# Patient Record
Sex: FEMALE
[De-identification: names, ages, dates, MRNs, and addresses within clinical notes are randomized per-mention and may not be internally consistent; named-entity substitution may affect disease eponyms.]

---

## 2018-07-12 ENCOUNTER — APPOINTMENT (OUTPATIENT)
Dept: ORTHOPEDIC SURGERY | Facility: CLINIC | Age: 68
End: 2018-07-12

## 2019-06-05 ENCOUNTER — APPOINTMENT (OUTPATIENT)
Dept: UROLOGY | Facility: CLINIC | Age: 69
End: 2019-06-05

## 2019-06-13 ENCOUNTER — APPOINTMENT (OUTPATIENT)
Dept: UROLOGY | Facility: CLINIC | Age: 69
End: 2019-06-13
Payer: MEDICARE

## 2019-06-13 VITALS
SYSTOLIC BLOOD PRESSURE: 171 MMHG | WEIGHT: 145 LBS | HEART RATE: 80 BPM | BODY MASS INDEX: 28.47 KG/M2 | TEMPERATURE: 98.6 F | HEIGHT: 60 IN | DIASTOLIC BLOOD PRESSURE: 80 MMHG | RESPIRATION RATE: 17 BRPM

## 2019-06-13 DIAGNOSIS — Z80.0 FAMILY HISTORY OF MALIGNANT NEOPLASM OF DIGESTIVE ORGANS: ICD-10-CM

## 2019-06-13 DIAGNOSIS — Z87.440 PERSONAL HISTORY OF URINARY (TRACT) INFECTIONS: ICD-10-CM

## 2019-06-13 DIAGNOSIS — N20.0 CALCULUS OF KIDNEY: ICD-10-CM

## 2019-06-13 DIAGNOSIS — N39.0 URINARY TRACT INFECTION, SITE NOT SPECIFIED: ICD-10-CM

## 2019-06-13 PROCEDURE — 99205 OFFICE O/P NEW HI 60 MIN: CPT

## 2019-06-13 RX ORDER — NITROFURANTOIN MONOHYDRATE/MACROCRYSTALLINE 25; 75 MG/1; MG/1
CAPSULE ORAL
Refills: 0 | Status: COMPLETED | COMMUNITY

## 2019-06-13 RX ORDER — CEPHALEXIN 500 MG/1
500 CAPSULE ORAL
Refills: 0 | Status: ACTIVE | COMMUNITY

## 2019-06-13 RX ORDER — FERROUS FUMARATE 324(106)MG
324 TABLET ORAL
Refills: 0 | Status: ACTIVE | COMMUNITY

## 2019-06-13 NOTE — HISTORY OF PRESENT ILLNESS
[FreeTextEntry1] : 68  y.o G 11 P10 , 9 LSCS 1 woman with hx of nephrolithiasis for past 10  years or so , has staghorn calculus not addressed due to fear of surgery. Denies constitutional ss. Hx of recurrent UTI for 10  years 3-4 per year and  mainly Ecoli.She is on Keflex now X 7 days and recent labs had NG.Fluids: 2 glass of water. She denies UI. Denies POP.CT Urogram showed right kidney large staghorn calculus, mild right sided hydronephrosis,stones in lower pole. \par \par \par Evaluation of Voiding Symptoms:\par \par Force of stream: strong\par Hesitancy: 0\par Intermittency: 0\par Dribblin\par Daytime frequency: q 5  h, and q 3-4 with UTI\par Night time frequency: 0\par Dysuria: 0\par Urgency: 0\par Urge Incontinence: 0\par Stress Incontinence: 0\par Pad usage: 0\par Straining to void: 0\par Incomplete emptyin\par UTIs:  3-4 per year for past 10 years or so.\par Hematuria: 0\par Stone disease:yes\par STD: 0\par Bowel issues: 0\par Dr Hoenig did a consult with pt for stone management and discussed surgical options and pt will inform him of her decision.\par \par

## 2019-06-13 NOTE — ASSESSMENT
[FreeTextEntry1] : Pt seen also at Dr. Unger's request regarding staghorn kidney stone, right kidney, in setting of recurrent UTI and likely connection.  \par \par CT scan reviewed: mult stones in staghorn configuration, mild right hydro, with dense stone, renal pelvis, lower pole, upper pole.\par \par I had long discussion with patient about their stones, and about options, risks, and benefits of all treatments.  Main options for definitive stone treatment include ESWL, URS, PCNL.  \par \par ESWL success best with smaller, less dense stones, and with short skin to stone distance and favorable location of the stone within the urinary tract, while URS is more successful treatment with multiple stones, more dense stones, or challenging body habitus or stone location.  PCNL is best option for larger, more dense and complex stones, and particularly those involving the lower pole.  Non-definitive stone treatment options for drainage, using either stents or nephrostomy, also reviewed: these are of lower immediate surgical risks, but incur multiple procedures to manage and may have their own complications and effects on quality of life.  Still, nephrostomy or nephroureteral catheter can allow maintenance of urinary system drainage without surgical risks, and management in office with exchanges (avoiding the anesthesia and testing which would be present with bilateral internal stent exchange).\par \par Risks of nontreatment with obstruction can lead to very high rate of renal function loss in stone-bearing kidney over the next months to years.\par \par In this patient's case, I recommend... right PCNL, with planned second look as moderate chance of needing mult access, mult stage to ensure complete stone removal in setting of likely infectious stones.\par \par Risks/benefits/success/recovery expectations all reviewed at length, particularly with respect to patient's comorbidities, and inclusive of infection/sepsis, bleeding, need for secondary procedures or secondary stages such as embolization or open surgery, and even risks of death due to acute issues superimposed on comorbidities.  Risks of nontreatment also reviewed, including loss of kidney function, recurrent infection/sepsis.  We discussed that if issues arise prior to surgery, or found to have pus in the kidney at time of surgery, we may need to place drain to optimally manage prior to definitive PCNL.\par \par Pt prefers to undergo: PCNL, second look... desires to do in August, despite above discussion, and long term hesitation to definitively manage stone\par \par Will schedule.\par \par Urine culture, PST appt to schedule once surgery scheduled.\par

## 2019-06-13 NOTE — PHYSICAL EXAM
[General Appearance - Well Developed] : well developed [General Appearance - Well Nourished] : well nourished [Well Groomed] : well groomed [Normal Appearance] : normal appearance [General Appearance - In No Acute Distress] : no acute distress [Edema] : no peripheral edema [Respiration, Rhythm And Depth] : normal respiratory rhythm and effort [Exaggerated Use Of Accessory Muscles For Inspiration] : no accessory muscle use [Abdomen Soft] : soft [Costovertebral Angle Tenderness] : no ~M costovertebral angle tenderness [Abdomen Tenderness] : non-tender [Urinary Bladder Findings] : the bladder was normal on palpation [External Female Genitalia] : normal external genitalia [FreeTextEntry1] : No POP, neg CST. [Normal Station and Gait] : the gait and station were normal for the patient's age [] : no rash [Oriented To Time, Place, And Person] : oriented to person, place, and time [Affect] : the affect was normal [Mood] : the mood was normal [Not Anxious] : not anxious

## 2019-06-13 NOTE — PHYSICAL EXAM
[General Appearance - Well Developed] : well developed [General Appearance - Well Nourished] : well nourished [Well Groomed] : well groomed [Normal Appearance] : normal appearance [General Appearance - In No Acute Distress] : no acute distress [Edema] : no peripheral edema [Exaggerated Use Of Accessory Muscles For Inspiration] : no accessory muscle use [Respiration, Rhythm And Depth] : normal respiratory rhythm and effort [Abdomen Soft] : soft [Costovertebral Angle Tenderness] : no ~M costovertebral angle tenderness [Abdomen Tenderness] : non-tender [Urinary Bladder Findings] : the bladder was normal on palpation [FreeTextEntry1] : No POP, neg CST. [External Female Genitalia] : normal external genitalia [Normal Station and Gait] : the gait and station were normal for the patient's age [] : no rash [Oriented To Time, Place, And Person] : oriented to person, place, and time [Affect] : the affect was normal [Mood] : the mood was normal [Not Anxious] : not anxious

## 2019-06-13 NOTE — REVIEW OF SYSTEMS
[Chills] : chills [see HPI] : see HPI [History of kidney stones] : history of kidney stones [Negative] : Heme/Lymph

## 2019-06-14 LAB
APPEARANCE: CLEAR
BACTERIA UR CULT: NORMAL
BACTERIA: ABNORMAL
BILIRUBIN URINE: NEGATIVE
BLOOD URINE: ABNORMAL
COLOR: NORMAL
GLUCOSE QUALITATIVE U: NEGATIVE
HYALINE CASTS: 0 /LPF
KETONES URINE: NEGATIVE
LEUKOCYTE ESTERASE URINE: ABNORMAL
MICROSCOPIC-UA: NORMAL
NITRITE URINE: NEGATIVE
PH URINE: 6
PROTEIN URINE: NORMAL
RED BLOOD CELLS URINE: 14 /HPF
SPECIFIC GRAVITY URINE: 1.02
SQUAMOUS EPITHELIAL CELLS: 6 /HPF
UROBILINOGEN URINE: NORMAL
WHITE BLOOD CELLS URINE: 62 /HPF